# Patient Record
Sex: FEMALE | Race: WHITE
[De-identification: names, ages, dates, MRNs, and addresses within clinical notes are randomized per-mention and may not be internally consistent; named-entity substitution may affect disease eponyms.]

---

## 2019-02-26 ENCOUNTER — HOSPITAL ENCOUNTER (EMERGENCY)
Dept: HOSPITAL 62 - ER | Age: 24
Discharge: HOME | End: 2019-02-26
Payer: SELF-PAY

## 2019-02-26 VITALS — SYSTOLIC BLOOD PRESSURE: 120 MMHG | DIASTOLIC BLOOD PRESSURE: 88 MMHG

## 2019-02-26 DIAGNOSIS — R09.81: ICD-10-CM

## 2019-02-26 DIAGNOSIS — R50.9: ICD-10-CM

## 2019-02-26 DIAGNOSIS — R51: ICD-10-CM

## 2019-02-26 DIAGNOSIS — J06.9: Primary | ICD-10-CM

## 2019-02-26 DIAGNOSIS — R05: ICD-10-CM

## 2019-02-26 DIAGNOSIS — R07.9: ICD-10-CM

## 2019-02-26 DIAGNOSIS — J40: ICD-10-CM

## 2019-02-26 LAB
ADD MANUAL DIFF: NO
ALBUMIN SERPL-MCNC: 4.4 G/DL (ref 3.5–5)
ALP SERPL-CCNC: 85 U/L (ref 38–126)
ALT SERPL-CCNC: 56 U/L (ref 9–52)
ANION GAP SERPL CALC-SCNC: 9 MMOL/L (ref 5–19)
APPEARANCE UR: CLEAR
APTT PPP: YELLOW S
AST SERPL-CCNC: 54 U/L (ref 14–36)
BASOPHILS # BLD AUTO: 0 10^3/UL (ref 0–0.2)
BASOPHILS NFR BLD AUTO: 0.4 % (ref 0–2)
BILIRUB DIRECT SERPL-MCNC: 0.1 MG/DL (ref 0–0.4)
BILIRUB SERPL-MCNC: 0.4 MG/DL (ref 0.2–1.3)
BILIRUB UR QL STRIP: NEGATIVE
BUN SERPL-MCNC: 12 MG/DL (ref 7–20)
CALCIUM: 9.2 MG/DL (ref 8.4–10.2)
CHLORIDE SERPL-SCNC: 104 MMOL/L (ref 98–107)
CO2 SERPL-SCNC: 27 MMOL/L (ref 22–30)
EOSINOPHIL # BLD AUTO: 0 10^3/UL (ref 0–0.6)
EOSINOPHIL NFR BLD AUTO: 0.4 % (ref 0–6)
ERYTHROCYTE [DISTWIDTH] IN BLOOD BY AUTOMATED COUNT: 13.2 % (ref 11.5–14)
GLUCOSE SERPL-MCNC: 99 MG/DL (ref 75–110)
GLUCOSE UR STRIP-MCNC: 150 MG/DL
HCT VFR BLD CALC: 39 % (ref 36–47)
HGB BLD-MCNC: 13.2 G/DL (ref 12–15.5)
KETONES UR STRIP-MCNC: NEGATIVE MG/DL
LYMPHOCYTES # BLD AUTO: 0.9 10^3/UL (ref 0.5–4.7)
LYMPHOCYTES NFR BLD AUTO: 20.7 % (ref 13–45)
MCH RBC QN AUTO: 26.5 PG (ref 27–33.4)
MCHC RBC AUTO-ENTMCNC: 33.8 G/DL (ref 32–36)
MCV RBC AUTO: 78 FL (ref 80–97)
MONOCYTES # BLD AUTO: 0.7 10^3/UL (ref 0.1–1.4)
MONOCYTES NFR BLD AUTO: 16 % (ref 3–13)
NEUTROPHILS # BLD AUTO: 2.8 10^3/UL (ref 1.7–8.2)
NEUTS SEG NFR BLD AUTO: 62.5 % (ref 42–78)
NITRITE UR QL STRIP: NEGATIVE
PH UR STRIP: 6 [PH] (ref 5–9)
PLATELET # BLD: 162 10^3/UL (ref 150–450)
POTASSIUM SERPL-SCNC: 4.3 MMOL/L (ref 3.6–5)
PROT SERPL-MCNC: 7.2 G/DL (ref 6.3–8.2)
PROT UR STRIP-MCNC: NEGATIVE MG/DL
RBC # BLD AUTO: 4.97 10^6/UL (ref 3.72–5.28)
SODIUM SERPL-SCNC: 139.6 MMOL/L (ref 137–145)
SP GR UR STRIP: 1.02
TOTAL CELLS COUNTED % (AUTO): 100 %
UROBILINOGEN UR-MCNC: NEGATIVE MG/DL (ref ?–2)
WBC # BLD AUTO: 4.5 10^3/UL (ref 4–10.5)

## 2019-02-26 PROCEDURE — 71046 X-RAY EXAM CHEST 2 VIEWS: CPT

## 2019-02-26 PROCEDURE — 93010 ELECTROCARDIOGRAM REPORT: CPT

## 2019-02-26 PROCEDURE — 81025 URINE PREGNANCY TEST: CPT

## 2019-02-26 PROCEDURE — 80053 COMPREHEN METABOLIC PANEL: CPT

## 2019-02-26 PROCEDURE — 96360 HYDRATION IV INFUSION INIT: CPT

## 2019-02-26 PROCEDURE — 81001 URINALYSIS AUTO W/SCOPE: CPT

## 2019-02-26 PROCEDURE — 85025 COMPLETE CBC W/AUTO DIFF WBC: CPT

## 2019-02-26 PROCEDURE — 99284 EMERGENCY DEPT VISIT MOD MDM: CPT

## 2019-02-26 PROCEDURE — 96361 HYDRATE IV INFUSION ADD-ON: CPT

## 2019-02-26 PROCEDURE — 93005 ELECTROCARDIOGRAM TRACING: CPT

## 2019-02-26 PROCEDURE — 36415 COLL VENOUS BLD VENIPUNCTURE: CPT

## 2019-02-26 NOTE — RADIOLOGY REPORT (SQ)
EXAM DESCRIPTION:  CHEST 2 VIEWS



COMPLETED DATE/TIME:  2/26/2019 7:50 am



REASON FOR STUDY:  cough



COMPARISON:  None.



EXAM PARAMETERS:  NUMBER OF VIEWS: two views

TECHNIQUE: Digital Frontal and Lateral radiographic views of the chest acquired.

RADIATION DOSE: NA

LIMITATIONS: none



FINDINGS:  LUNGS AND PLEURA: No opacities, masses or pneumothorax. No pleural effusion.

MEDIASTINUM AND HILAR STRUCTURES: No masses or contour abnormalities.

HEART AND VASCULAR STRUCTURES: Heart normal size.  No evidence for failure.

BONES: No acute findings.

HARDWARE: None in the chest.

OTHER: No other significant finding.



IMPRESSION:  1. NO ACUTE RADIOGRAPHIC FINDING IN THE CHEST.



TECHNICAL DOCUMENTATION:  JOB ID:  [id number]

 2011 [employer]- All Rights Reserved



Reading location - IP/workstation name: [email or web address]

## 2019-02-26 NOTE — ER DOCUMENT REPORT
ED General





- General


Chief Complaint: Painful Cough


Stated Complaint: CHEST PAIN


Time Seen by Provider: 02/26/19 07:40


Notes: 





Patient is a 23-year-old female presents to the emergency department for 

generalized cough, congestion, subjective fever, headache, chest pain upon deep 

inspiration and cough for the last 3 days.  Patient states initially she has 

been coughing up green sputum that has been blood tinged.  Patient states she 

has overall body aches and just feels more tired than normal.  





Past medical history: None


Medications: None


Allergies: None


Last menstrual period 2/21/2019








TRAVEL OUTSIDE OF THE [place] IN LAST 30 DAYS: No





- Related Data


Allergies/Adverse Reactions: 


                                        





No Known Allergies Allergy (Verified 07/05/17 12:03)


   











Past Medical History





- General


Information source: Patient





- Social History


Smoking Status: Unknown if Ever Smoked


Family History: Reviewed & Not Pertinent


Patient has suicidal ideation: No


Patient has homicidal ideation: No





- Past Medical History


Cardiac Medical History: 


   Denies: Hx Coronary Artery Disease, Hx Heart Attack, Hx Hypertension


Pulmonary Medical History: 


   Denies: Hx Asthma, Hx Bronchitis, Hx COPD, Hx Pneumonia


Neurological Medical History: Denies: Hx Cerebrovascular Accident, Hx Seizures


Renal/ Medical History: Denies: Hx Peritoneal Dialysis


Musculoskeletal Medical History: Denies Hx Arthritis


Past Surgical History: Reports: Hx Appendectomy





- Immunizations


Hx Diphtheria, Pertussis, Tetanus Vaccination: Yes





Review of Systems





- Review of Systems


Constitutional: See HPI


EENT: See HPI


Cardiovascular: See HPI


Respiratory: See HPI


Gastrointestinal: No symptoms reported


Genitourinary: No symptoms reported


Female Genitourinary: See HPI


Musculoskeletal: See HPI


Skin: No symptoms reported


Hematologic/Lymphatic: No symptoms reported


Neurological/Psychological: See HPI





Physical Exam





- Vital signs


Vitals: 


                                        











Temp Pulse Resp BP Pulse Ox


 


 99.3 F   83   16   121/62   97 


 


 02/26/19 07:09  02/26/19 07:09  02/26/19 07:09  02/26/19 07:09  02/26/19 07:09














- Notes


Notes: 





GENERAL: Alert, interacts well. No acute distress.


HEAD: Normocephalic, atraumatic.  No frontal or maxillary sinus tenderness noted


EYES: Pupils equal, round, and reactive to light. Extraocular movements intact.


ENT: Oral mucosa moist, tongue midline. Nares patent, TM's intact, 

nonerythematous, nonbulging bilaterally.  Pharynx within normal limits no 

palatal petechiae noted.


NECK: Full range of motion. Supple. Trachea midline.  No nuchal rigidity noted


LUNGS: Clear to auscultation bilaterally, no wheezes, rales, or rhonchi. No 

respiratory distress.


HEART: Regular rate and rhythm. No murmur


ABDOMEN: Soft, non-tender. Non-distended. Bowel sounds present in all 4 

quadrants.  No McBurney's point tenderness, no Sinclair sign noted


EXTREMITIES: Moves all 4 extremities spontaneously. No edema, normal radial and 

dorsalis pedis pulses bilaterally. No cyanosis.


BACK: no cervical, thoracic, lumbar midline tenderness. No saddle anesthesia, 

normal distal neurovascular exam.  No CVA tenderness noted bilaterally


NEUROLOGICAL: Alert and oriented x3. Normal speech. cranial nerves II through 

XII grossly intact. 


PSYCH: Normal affect, normal mood.


SKIN: Warm, dry, normal turgor. No rashes or lesions noted.








Course





- Re-evaluation


Re-evalutation: 





02/26/19 11:06


Patient's labs show no signs of leukocytosis, no signs of anemia, no signs of 

electrolyte abnormalities.  Patient's urine shows no signs of infection, 

negative hCG.  She does appear well-hydrated with a specific gravity of 1.018 

with negative ketones.  Patient's chest x-ray shows no signs of pneumonia, 

pneumothorax, rib fracture.  Patient overall states she feels better after fluid

administration and Motrin.


Patient's EKG shows a sinus rhythm 69, QTc 403, no ST segment elevations or 

depressions noted.


Discussed likely viral diagnosis, potential bronchitis which is also viral in 

nature.  Patient voices understanding.  Patient does not meet CDC 

recommendations for influenza testing.  Patient is non-tachycardic, afebrile, 

stable for discharge.





- Vital Signs


Vital signs: 


                                        











Temp Pulse Resp BP Pulse Ox


 


 98.2 F   62   16   120/88 H  100 


 


 02/26/19 10:39  02/26/19 10:39  02/26/19 07:09  02/26/19 10:39  02/26/19 10:39














- Laboratory


Result Diagrams: 


                                 02/26/19 08:29





                                 02/26/19 08:29


Laboratory results interpreted by me: 


                                        











  02/26/19 02/26/19 02/26/19





  08:29 08:29 09:17


 


MCV  78 L  


 


MCH  26.5 L  


 


Monocytes %  16.0 H  


 


AST   54 H 


 


ALT   56 H 


 


Urine Glucose (UA)    150 H














Discharge





- Discharge


Clinical Impression: 


 Bronchitis





Upper respiratory infection


Qualifiers:


 URI type: unspecified viral URI Qualified Code(s): J06.9 - Acute upper 

respiratory infection, unspecified





Condition: Stable


Disposition: HOME, SELF-CARE


Instructions:  Viral Syndrome ([facility]), Upper Respiratory Illness ([facility]), Bronchitis

([facility])


Additional Instructions: 


As we discussed you have been seen and treated in the emergency department for 

an upper respiratory infection.  Upper respiratory infections are typically 

caused by viruses and do not respond to antibiotics.  Please make sure you 

continue to take over-the-counter Tylenol and Motrin for your generalized body 

aches headache and fever.  Please also follow-up with your primary care provider

in the next 24-48 hours.  Please also return to the emergency room should he 

have any other concerning symptoms.


Prescriptions: 


Benzonatate [Tessalon Perles 100 mg Capsule] 100 mg PO Q8HP PRN #40 capsule


 PRN Reason: 


Forms:  Return to Work

## 2019-06-12 ENCOUNTER — HOSPITAL ENCOUNTER (EMERGENCY)
Dept: HOSPITAL 62 - ER | Age: 24
Discharge: HOME | End: 2019-06-12
Payer: MEDICAID

## 2019-06-12 VITALS — DIASTOLIC BLOOD PRESSURE: 61 MMHG | SYSTOLIC BLOOD PRESSURE: 111 MMHG

## 2019-06-12 DIAGNOSIS — Z90.49: ICD-10-CM

## 2019-06-12 DIAGNOSIS — R10.2: ICD-10-CM

## 2019-06-12 DIAGNOSIS — N83.201: Primary | ICD-10-CM

## 2019-06-12 LAB
ADD MANUAL DIFF: NO
ALBUMIN SERPL-MCNC: 4.7 G/DL (ref 3.5–5)
ALP SERPL-CCNC: 69 U/L (ref 38–126)
ALT SERPL-CCNC: 41 U/L (ref 9–52)
ANION GAP SERPL CALC-SCNC: 9 MMOL/L (ref 5–19)
APPEARANCE UR: (no result)
APTT PPP: YELLOW S
AST SERPL-CCNC: 27 U/L (ref 14–36)
BACTERIA (WET MOUNT): (no result)
BASOPHILS # BLD AUTO: 0 10^3/UL (ref 0–0.2)
BASOPHILS NFR BLD AUTO: 0.7 % (ref 0–2)
BILIRUB DIRECT SERPL-MCNC: 0.2 MG/DL (ref 0–0.4)
BILIRUB SERPL-MCNC: 0.5 MG/DL (ref 0.2–1.3)
BILIRUB UR QL STRIP: NEGATIVE
BUN SERPL-MCNC: 13 MG/DL (ref 7–20)
CALCIUM: 9.6 MG/DL (ref 8.4–10.2)
CHLAM PCR: NOT DETECTED
CHLORIDE SERPL-SCNC: 104 MMOL/L (ref 98–107)
CO2 SERPL-SCNC: 27 MMOL/L (ref 22–30)
EOSINOPHIL # BLD AUTO: 0.2 10^3/UL (ref 0–0.6)
EOSINOPHIL NFR BLD AUTO: 2.3 % (ref 0–6)
EPITHELIALS (WET MOUNT): (no result)
ERYTHROCYTE [DISTWIDTH] IN BLOOD BY AUTOMATED COUNT: 13.7 % (ref 11.5–14)
GLUCOSE SERPL-MCNC: 92 MG/DL (ref 75–110)
GLUCOSE UR STRIP-MCNC: 150 MG/DL
GON PCR: NOT DETECTED
HCT VFR BLD CALC: 40.9 % (ref 36–47)
HGB BLD-MCNC: 13.6 G/DL (ref 12–15.5)
KETONES UR STRIP-MCNC: NEGATIVE MG/DL
LYMPHOCYTES # BLD AUTO: 1.5 10^3/UL (ref 0.5–4.7)
LYMPHOCYTES NFR BLD AUTO: 21.7 % (ref 13–45)
MCH RBC QN AUTO: 26 PG (ref 27–33.4)
MCHC RBC AUTO-ENTMCNC: 33.4 G/DL (ref 32–36)
MCV RBC AUTO: 78 FL (ref 80–97)
MONOCYTES # BLD AUTO: 0.7 10^3/UL (ref 0.1–1.4)
MONOCYTES NFR BLD AUTO: 9.5 % (ref 3–13)
NEUTROPHILS # BLD AUTO: 4.6 10^3/UL (ref 1.7–8.2)
NEUTS SEG NFR BLD AUTO: 65.8 % (ref 42–78)
NITRITE UR QL STRIP: NEGATIVE
PH UR STRIP: 5 [PH] (ref 5–9)
PLATELET # BLD: 175 10^3/UL (ref 150–450)
POTASSIUM SERPL-SCNC: 4.1 MMOL/L (ref 3.6–5)
PROT SERPL-MCNC: 7.6 G/DL (ref 6.3–8.2)
PROT UR STRIP-MCNC: NEGATIVE MG/DL
RBC # BLD AUTO: 5.25 10^6/UL (ref 3.72–5.28)
SODIUM SERPL-SCNC: 140.2 MMOL/L (ref 137–145)
SP GR UR STRIP: 1.03
T.VAGINALIS (WET MOUNT): (no result)
TOTAL CELLS COUNTED % (AUTO): 100 %
UROBILINOGEN UR-MCNC: 2 MG/DL (ref ?–2)
WBC # BLD AUTO: 7 10^3/UL (ref 4–10.5)
WBCS (WET MOUNT): (no result)
YEAST (WET MOUNT): (no result)

## 2019-06-12 PROCEDURE — 84702 CHORIONIC GONADOTROPIN TEST: CPT

## 2019-06-12 PROCEDURE — 81001 URINALYSIS AUTO W/SCOPE: CPT

## 2019-06-12 PROCEDURE — 85025 COMPLETE CBC W/AUTO DIFF WBC: CPT

## 2019-06-12 PROCEDURE — 99284 EMERGENCY DEPT VISIT MOD MDM: CPT

## 2019-06-12 PROCEDURE — 87591 N.GONORRHOEAE DNA AMP PROB: CPT

## 2019-06-12 PROCEDURE — 93976 VASCULAR STUDY: CPT

## 2019-06-12 PROCEDURE — 87491 CHLMYD TRACH DNA AMP PROBE: CPT

## 2019-06-12 PROCEDURE — 36415 COLL VENOUS BLD VENIPUNCTURE: CPT

## 2019-06-12 PROCEDURE — 80053 COMPREHEN METABOLIC PANEL: CPT

## 2019-06-12 PROCEDURE — 87210 SMEAR WET MOUNT SALINE/INK: CPT

## 2019-06-12 PROCEDURE — 76830 TRANSVAGINAL US NON-OB: CPT

## 2019-06-12 NOTE — ER DOCUMENT REPORT
ED General





- General


Chief Complaint: Abdominal Pain


Stated Complaint: LOWER ABDOMINAL PAIN


Time Seen by Provider: 06/12/19 15:35


Primary Care Provider: 


ALBA LOMBARDO MD [ACTIVE STAFF] - Follow up as needed


HEALTH,EMPLOYEE [ACTIVE STAFF] - Follow up as needed


Mode of Arrival: Ambulatory


Information source: Patient, [facility] Records


Notes: 





23-year-old female with no reported past medical history presents with 2 days of

pelvic pain.  Patient states the pain started after intercourse 2 days ago.  

Initially she described it as a aching cramping pain that has progressively 

worsened.  Patient denies any foreign body insertion, rough intercourse, vaginal

bleeding, vaginal discharge, dysuria, hematuria.  Last menstrual period was May 

11, 2019.  Patient has a surgical history of appendectomy, cholecystectomy.  

Patient has not taken any medication for this.


TRAVEL OUTSIDE OF THE [place] IN LAST 30 DAYS: No





- HPI


Onset: Other


Onset/Duration: Gradual, Persistent, Worse


Quality of pain: Achy, Cramping


Severity: Moderate


Pain Level: 2


Associated symptoms: denies: Chest pain, Nonproductive cough, Fever, Nausea, 

Vomiting, Shortness of breath


Exacerbated by: Other - [place]


Relieved by: Denies


Similar symptoms previously: No


Recently seen / treated by doctor: No





- Related Data


Allergies/Adverse Reactions: 


                                        





No Known Allergies Allergy (Verified 07/05/17 12:03)


   











Past Medical History





- General


Information source: Patient





- Social History


Smoking Status: Unknown if Ever Smoked


Chew tobacco use (# tins/day): No


Frequency of alcohol use: None


Drug Abuse: None


Family History: Reviewed & Not Pertinent


Patient has suicidal ideation: No


Patient has homicidal ideation: No





- Past Medical History


Cardiac Medical History: 


   Denies: Hx Coronary Artery Disease, Hx Heart Attack, Hx Hypertension


Pulmonary Medical History: 


   Denies: Hx Asthma, Hx Bronchitis, Hx COPD, Hx Pneumonia


Neurological Medical History: Denies: Hx Cerebrovascular Accident, Hx Seizures


Renal/ Medical History: Denies: Hx Peritoneal Dialysis


Musculoskeletal Medical History: Denies Hx Arthritis


Past Surgical History: Reports: Hx Appendectomy, Hx Cholecystectomy





- Immunizations


Hx Diphtheria, Pertussis, Tetanus Vaccination: Yes





Review of Systems





- Review of Systems


Notes: 





REVIEW OF SYSTEMS:


CONSTITUTIONAL :  Denies fever,  chills, or sweats.  Denies recent illness. 

Denies weight loss, recent hospitalizations. 


EENT: Denies visual changes, eye pain.  Denies sore throat, oral lesions, 

difficulty swallowing.


CARDIOVASCULAR:  Denies chest pain.  Denies palpitations. Denies lower extremity

edema.


RESPIRATORY:  Denies cough. Denies shortness of breath, wheezing.


GASTROINTESTINAL:  Denies abdominal  distention.  Denies nausea, vomiting, or 

diarrhea.  Denies blood in vomitus, stools, or per rectum.  Denies black, tarry 

stools.  Denies constipation.  


GENITOURINARY:  Denies difficulty urinating, painful urination,  frequency, 

blood in urine, or  vaginal discharge.


MUSCULOSKELETAL:  Denies back or neck pain or stiffness.  Denies joint pain or 

swelling.


SKIN:   Denies rash, lesions or sores.


HEMATOLOGIC :   Denies easy bruising or bleeding.


LYMPHATIC:  Denies swollen glands.


NEUROLOGICAL:  Denies confusion or altered mental status.  Denies loss of 

consciousness.  Denies dizziness or lightheadedness.  Denies headache.  Denies 

weakness or paralysis.  Denies problems difficulty with ambulation, slurred 

speech.  Denies sensory loss, numbness, or tingling.  Denies seizures.


PSYCHIATRIC:  Denies anxiety or stress.  Denies depression, suicidal ideation, 

or homicidal ideation.  Denies visual or auditory hallucinations.








Physical Exam





- Vital signs


Vitals: 


                                        











Temp Pulse Resp BP Pulse Ox


 


 98.5 F   85   18   123/61   100 


 


 06/12/19 15:21  06/12/19 15:21  06/12/19 15:21  06/12/19 15:21  06/12/19 15:21














- Notes


Notes: 





PHYSICAL EXAMINATION:





GENERAL: Well-appearing, well-nourished and in no acute distress.





HEAD: Atraumatic, normocephalic.





EYES: Pupils equal round and reactive to light, extraocular movements intact, 

conjunctiva are normal.





ENT: Nares patent, oropharynx clear without exudates.  Moist mucous membranes.





NECK: Normal range of motion, supple without lymphadenopathy





LUNGS: Breath sounds clear to auscultation bilaterally and equal.  No wheezes 

rales or rhonchi.





HEART: Regular rate and rhythm without murmurs





ABDOMEN: Soft, nontender, nondistended abdomen.  No guarding, no rebound.  No ma

sses appreciated.





Female : Pelvic exam; External genitalia unremarkable. Speculum exam with no 

discharge.  Vaginal wall unremarkable.  Os closed.  No cervical motion 

tenderness.  No adnexal tenderness or masses appreciated.  Swabs obtained for 

gonorrhea, chlamydia and wet prep.





Musculoskeletal: Normal range of motion, no pitting or edema.  No cyanosis.





NEUROLOGICAL: Cranial nerves grossly intact.  Normal speech, normal gait.  

Normal sensory, motor exams





PSYCH: Normal mood, normal affect.





SKIN: Warm, Dry, normal turgor, no rashes or lesions noted.





Course





- Re-evaluation


Re-evalutation: 





06/12/19 18:06





Laboratory











  06/12/19 06/12/19 06/12/19





  15:54 15:54 15:54


 


WBC  7.0  


 


RBC  5.25  


 


Hgb  13.6  


 


Hct  40.9  


 


MCV  78 L  


 


MCH  26.0 L  


 


MCHC  33.4  


 


RDW  13.7  


 


Plt Count  175  


 


Seg Neutrophils %  65.8  


 


Lymphocytes %  21.7  


 


Monocytes %  9.5  


 


Eosinophils %  2.3  


 


Basophils %  0.7  


 


Absolute Neutrophils  4.6  


 


Absolute Lymphocytes  1.5  


 


Absolute Monocytes  0.7  


 


Absolute Eosinophils  0.2  


 


Absolute Basophils  0.0  


 


Sodium   140.2 


 


Potassium   4.1 


 


Chloride   104 


 


Carbon Dioxide   27 


 


Anion Gap   9 


 


BUN   13 


 


Creatinine   0.94 


 


Est GFR ( Amer)   > 60 


 


Est GFR (Non-Af Amer)   > 60 


 


Glucose   92 


 


Calcium   9.6 


 


Total Bilirubin   0.5 


 


Direct Bilirubin   0.2 


 


Neonat Total Bilirubin   Not Reportable 


 


Neonat Direct Bilirubin   Not Reportable 


 


Neonat Indirect Bili   Not Reportable 


 


AST   27 


 


ALT   41 


 


Alkaline Phosphatase   69 


 


Total Protein   7.6 


 


Albumin   4.7 


 


Beta HCG, Quant   < 2.39 


 


Total Beta HCG   NEGATIVE 


 


Urine Color    YELLOW


 


Urine Appearance    SLIGHTLY-CLOUDY


 


Urine pH    5.0


 


Ur Specific Gravity    1.029


 


Urine Protein    NEGATIVE


 


Urine Glucose (UA)    150 H


 


Urine Ketones    NEGATIVE


 


Urine Blood    NEGATIVE


 


Urine Nitrite    NEGATIVE


 


Urine Bilirubin    NEGATIVE


 


Urine Urobilinogen    2.0 H


 


Ur Leukocyte Esterase    NEGATIVE


 


Urine WBC (Auto)    1


 


Urine RBC (Auto)    1


 


U Hyaline Cast (Auto)    1


 


Squamous Epi Cells Auto    1


 


Urine Mucus (Auto)    RARE


 


Urine Ascorbic Acid    NEGATIVE











                                        





Transvaginal US  06/12/19 15:38


IMPRESSION:  Small complex cyst in the right adnexa.  Possible involuting cyst 

or ruptured cyst.  There is some free fluid.


 











                                        











Temp Pulse Resp BP Pulse Ox


 


 98.5 F   85   18   123/61   100 


 


 06/12/19 15:21  06/12/19 15:21  06/12/19 15:21  06/12/19 15:21  06/12/19 15:21











06/12/19 18:09


23-year-old female presents with 2 days of pelvic pain that occurred during 

intercourse and has progressively worsened.  Vital signs reviewed and within 

normal limits.  Patient does not appear toxic or dehydrated.  She is in no acute

distress.  Previous medical records and nursing notes reviewed.  Exam is 

significant for suprapubic tenderness.  CBC is without leukocytosis or anemia.  

CMP shows no electrolyte abnormality.  Urinalysis not consistent with infection.

 hCG negative.  Wet mount and GC pending.





Transvaginal ultrasound was obtained and showed a small complex cyst on the 

right adnexa with a small amount of free fluid.  Patient declining pain 

medication at this time.  Advised patient that NSAID medication could greatly 

help her discomfort.  Also advised her that she would need repeat follow-up for 

the complex cyst with OB/GYN.  Patient was treated with Flagyl for her bacterial

vaginosis.  Gonorrhea chlamydia negative.


06/13/19 19:44


Patient was evaluated and treated as appropriate for the patient's presenting 

symptoms and complaint, with consideration of any critical or life threatening 

conditions that may be associated with their obtained history and exam as noted 

above. All results were discussed with patient. Patient provided the opportunity

to ask questions, and express concerns. Patient was educated on treatments based

on their presumed diagnosis as noted above.  At this time we will discharge the 

patient with return precautions and follow-up recommendations.  Verbal discharge

instructions given a the bedside. Medication warnings reviewed. Patient is in 

agreement with this plan and has verbalized understanding of return precautions.







After careful consideration I feel that that patient can be safely discharged 

from the emergency department,  they were advised to followup with a primary 

care physician in 2-3 days. 








Dictation on this chart was performed using voice recognition software and may 

result in unintended grammatical, spelling, syntax or errors.

















- Vital Signs


Vital signs: 


                                        











Temp Pulse Resp BP Pulse Ox


 


 98.1 F   73   18   111/61   100 


 


 06/12/19 18:49  06/12/19 18:49  06/12/19 18:49  06/12/19 18:49  06/12/19 18:49














- Laboratory


Result Diagrams: 


                                 06/12/19 15:54





                                 06/12/19 15:54


Laboratory results interpreted by me: 


                                        











  06/12/19 06/12/19





  15:54 15:54


 


MCV  78 L 


 


MCH  26.0 L 


 


Urine Glucose (UA)   150 H


 


Urine Urobilinogen   2.0 H














- Diagnostic Test


Radiology reviewed: Image reviewed, Reports reviewed





Discharge





- Discharge


Clinical Impression: 


 Pelvic pain, Complex cyst of right ovary





Condition: Good


Disposition: HOME, SELF-CARE


Instructions:  Abdominal Pain ([facility]), Ovarian Cyst ([facility]), Vaginosis, Bacterial 

([facility])


Additional Instructions: 


You are being treated for bacterial vaginosis, an overgrowth of normal bacteria 

in the vagina. You are being sent home on an antibiotic called metronidazole.  

Take exactly as directed.  Never drink alcohol while taking this antibiotic.  

Please return if you develop abdominal pain, fever greater than 101F, some 

vomiting, or any other symptoms that are concerning to you.








Your ultrasound showed a complex cyst.  He will need to follow-up with OB/GYN.


Prescriptions: 


Metronidazole [Flagyl 500 mg Tablet] 500 mg PO BID 7 Days #14 tablet


Ondansetron [Zofran Odt 4 mg Tablet] 1 - 2 tab PO Q4H PRN #15 tab.rapdis


 PRN Reason: For Nausea/Vomiting


Forms:  Return to Work


Referrals: 


HEALTH,EMPLOYEE [ACTIVE STAFF] - Follow up as needed


ALBA LOMBARDO MD [ACTIVE STAFF] - Follow up as needed

## 2019-06-12 NOTE — RADIOLOGY REPORT (SQ)
EXAM DESCRIPTION:  U/S NON OB PEL TV W/DOPPLER



COMPLETED DATE/TIME:  6/12/2019 5:12 pm



REASON FOR STUDY:  pelvic pain



COMPARISON:  None.



TECHNIQUE:  Dynamic and static grayscale images acquired of the pelvis via transvaginal approach and 
recorded on PACS. Additional selected color Doppler and spectral images recorded.



LIMITATIONS:  None.



FINDINGS:  UTERUS: Contour normal.  No mass.

ENDOMETRIAL STRIPE: No focal or generalized thickening. No masses.

CERVIX: 2.2 cm.  No nabothian cysts.

RIGHT OVARY AND DOPPLER: Normal size. No worrisome masses.  2.3 cm complex cyst.  Normal arterial vas
cular flow without evidence for torsion.

LEFT OVARY AND DOPPLER: Normal size. No worrisome masses. Normal arterial vascular flow without evide
nce for torsion.

FREE FLUID: There is some free fluid in the posterior cul-de-sac and in the right adnexa.

OTHER: No other significant finding.

MEASUREMENTS:

UTERUS: 10.5 x 5.7 x 5.1 cm.

ENDOMETRIAL STRIPE: 1.5 cm.

RIGHT OVARY: 4.2 x 3.3 x 3 cm.

LEFT OVARY: 2.8 x 2.4 x 2.2 cm.



IMPRESSION:  Small complex cyst in the right adnexa.  Possible involuting cyst or ruptured cyst.  The
re is some free fluid.



TECHNICAL DOCUMENTATION:  JOB ID:  [id number]

 [employer]- All Rights Reserved                          Rev-[id number]



Reading location - IP/workstation name: [email or web address]

## 2019-06-12 NOTE — ER DOCUMENT REPORT
ED Medical Screen (RME)





- General


Chief Complaint: Abdominal Pain


Stated Complaint: LOWER ABDOMINAL PAIN


Time Seen by Provider: 06/12/19 15:35


Primary Care Provider: 


HEALTH,EMPLOYEE [Primary Care Provider] - Follow up as needed


Mode of Arrival: Ambulatory


Information source: Patient


Notes: 





22-year-old female presented to ED for complaint of pelvic pain for the last 2 

to 3 days.  She states it started while she and her  were having sex.  

She states that her last menstrual period was 5/11/2019.  She states the pain is

been slowly increasing since this started.  She states is bilateral lower 

pelvic.  She denies any vaginal bleeding.  She denies any vaginal discharge.  

She denies any urinary symptoms.











I have greeted and performed a rapid initial assessment of this patient.  A 

comprehensive ED assessment and evaluation of the patient, analysis of test 

results and completion of medical decision making process will be conducted by 

an additional ED providers.








Dictation of this chart was performed using voice recognition software; 

therefore, there may be some unintended grammatical errors.


TRAVEL OUTSIDE OF THE [place] IN LAST 30 DAYS: No





- Related Data


Allergies/Adverse Reactions: 


                                        





No Known Allergies Allergy (Verified 07/05/17 12:03)


   











Past Medical History





- Social History


Chew tobacco use (# tins/day): No


Frequency of alcohol use: None


Drug Abuse: None





- Past Medical History


Cardiac Medical History: 


   Denies: Hx Coronary Artery Disease, Hx Heart Attack, Hx Hypertension


Pulmonary Medical History: 


   Denies: Hx Asthma, Hx Bronchitis, Hx COPD, Hx Pneumonia


Neurological Medical History: Denies: Hx Cerebrovascular Accident, Hx Seizures


Renal/ Medical History: Denies: Hx Peritoneal Dialysis


Musculoskeltal Medical History: Denies Hx Arthritis


Past Surgical History: Reports: Hx Appendectomy, Hx Cholecystectomy





- Immunizations


Hx Diphtheria, Pertussis, Tetanus Vaccination: Yes





Physical Exam





- Vital signs


Vitals: 





                                        











Temp Pulse Resp BP Pulse Ox


 


 98.5 F   85   18   123/61   100 


 


 06/12/19 15:21  06/12/19 15:21  06/12/19 15:21  06/12/19 15:21  06/12/19 15:21














Course





- Vital Signs


Vital signs: 





                                        











Temp Pulse Resp BP Pulse Ox


 


 98.5 F   85   18   123/61   100 


 


 06/12/19 15:21  06/12/19 15:21  06/12/19 15:21  06/12/19 15:21  06/12/19 15:21














Doctor's Discharge





- Discharge


Referrals: 


HEALTH,EMPLOYEE [Primary Care Provider] - Follow up as needed

## 2020-10-11 ENCOUNTER — HOSPITAL ENCOUNTER (EMERGENCY)
Dept: HOSPITAL 62 - ER | Age: 25
Discharge: ANOTHER HOSPITAL | End: 2020-10-11
Payer: SELF-PAY

## 2020-10-11 VITALS — SYSTOLIC BLOOD PRESSURE: 125 MMHG | DIASTOLIC BLOOD PRESSURE: 75 MMHG

## 2020-10-11 DIAGNOSIS — R29.810: ICD-10-CM

## 2020-10-11 DIAGNOSIS — R20.2: ICD-10-CM

## 2020-10-11 DIAGNOSIS — R29.711: ICD-10-CM

## 2020-10-11 DIAGNOSIS — M62.81: ICD-10-CM

## 2020-10-11 DIAGNOSIS — G83.9: ICD-10-CM

## 2020-10-11 DIAGNOSIS — I63.89: Primary | ICD-10-CM

## 2020-10-11 LAB
ADD MANUAL DIFF: NO
ALBUMIN SERPL-MCNC: 5.2 G/DL (ref 3.5–5)
ALP SERPL-CCNC: 70 U/L (ref 38–126)
ANION GAP SERPL CALC-SCNC: 13 MMOL/L (ref 5–19)
APTT BLD: 29.5 SEC (ref 23.5–35.8)
AST SERPL-CCNC: 22 U/L (ref 14–36)
BASOPHILS # BLD AUTO: 0 10^3/UL (ref 0–0.2)
BASOPHILS NFR BLD AUTO: 0.3 % (ref 0–2)
BILIRUB DIRECT SERPL-MCNC: 0.2 MG/DL (ref 0–0.4)
BILIRUB SERPL-MCNC: 0.4 MG/DL (ref 0.2–1.3)
BUN SERPL-MCNC: 14 MG/DL (ref 7–20)
CALCIUM: 9.8 MG/DL (ref 8.4–10.2)
CHLORIDE SERPL-SCNC: 102 MMOL/L (ref 98–107)
CK MB SERPL-MCNC: 0.23 NG/ML (ref ?–4.55)
CK SERPL-CCNC: 34 U/L (ref 30–135)
CO2 SERPL-SCNC: 25 MMOL/L (ref 22–30)
EOSINOPHIL # BLD AUTO: 0.1 10^3/UL (ref 0–0.6)
EOSINOPHIL NFR BLD AUTO: 1.2 % (ref 0–6)
ERYTHROCYTE [DISTWIDTH] IN BLOOD BY AUTOMATED COUNT: 13.2 % (ref 11.5–14)
GLUCOSE SERPL-MCNC: 107 MG/DL (ref 75–110)
HCT VFR BLD CALC: 41.7 % (ref 36–47)
HGB BLD-MCNC: 14.2 G/DL (ref 12–15.5)
INR PPP: 0.99
LYMPHOCYTES # BLD AUTO: 1.5 10^3/UL (ref 0.5–4.7)
LYMPHOCYTES NFR BLD AUTO: 19.4 % (ref 13–45)
MCH RBC QN AUTO: 26.6 PG (ref 27–33.4)
MCHC RBC AUTO-ENTMCNC: 34.1 G/DL (ref 32–36)
MCV RBC AUTO: 78 FL (ref 80–97)
MONOCYTES # BLD AUTO: 0.7 10^3/UL (ref 0.1–1.4)
MONOCYTES NFR BLD AUTO: 8.9 % (ref 3–13)
NEUTROPHILS # BLD AUTO: 5.4 10^3/UL (ref 1.7–8.2)
NEUTS SEG NFR BLD AUTO: 70.2 % (ref 42–78)
PLATELET # BLD: 202 10^3/UL (ref 150–450)
POTASSIUM SERPL-SCNC: 3.8 MMOL/L (ref 3.6–5)
PROT SERPL-MCNC: 8.2 G/DL (ref 6.3–8.2)
PROTHROMBIN TIME: 13.3 SEC (ref 11.4–15.4)
RBC # BLD AUTO: 5.33 10^6/UL (ref 3.72–5.28)
TOTAL CELLS COUNTED % (AUTO): 100 %
TROPONIN I SERPL-MCNC: < 0.012 NG/ML
WBC # BLD AUTO: 7.6 10^3/UL (ref 4–10.5)

## 2020-10-11 PROCEDURE — 85610 PROTHROMBIN TIME: CPT

## 2020-10-11 PROCEDURE — 85025 COMPLETE CBC W/AUTO DIFF WBC: CPT

## 2020-10-11 PROCEDURE — 84703 CHORIONIC GONADOTROPIN ASSAY: CPT

## 2020-10-11 PROCEDURE — 82962 GLUCOSE BLOOD TEST: CPT

## 2020-10-11 PROCEDURE — 82553 CREATINE MB FRACTION: CPT

## 2020-10-11 PROCEDURE — 93010 ELECTROCARDIOGRAM REPORT: CPT

## 2020-10-11 PROCEDURE — 36415 COLL VENOUS BLD VENIPUNCTURE: CPT

## 2020-10-11 PROCEDURE — 84484 ASSAY OF TROPONIN QUANT: CPT

## 2020-10-11 PROCEDURE — 70450 CT HEAD/BRAIN W/O DYE: CPT

## 2020-10-11 PROCEDURE — 80053 COMPREHEN METABOLIC PANEL: CPT

## 2020-10-11 PROCEDURE — 71045 X-RAY EXAM CHEST 1 VIEW: CPT

## 2020-10-11 PROCEDURE — 85730 THROMBOPLASTIN TIME PARTIAL: CPT

## 2020-10-11 PROCEDURE — 99285 EMERGENCY DEPT VISIT HI MDM: CPT

## 2020-10-11 PROCEDURE — 93005 ELECTROCARDIOGRAM TRACING: CPT

## 2020-10-11 PROCEDURE — 37195 THROMBOLYTIC THERAPY STROKE: CPT

## 2020-10-11 PROCEDURE — 82550 ASSAY OF CK (CPK): CPT

## 2020-10-11 NOTE — ER DOCUMENT REPORT
ED Neuro Symptoms/Deficit





- General


Chief Complaint: S/S of Possible Stroke


Stated Complaint: RIGHT UNDER ARM ISSUE


Time Seen by Provider: 10/11/20 19:51


Mode of Arrival: Ambulatory


TRAVEL OUTSIDE OF THE [place] IN LAST 30 DAYS: No





- HPI


Patient complains to provider of: Facial Droop, Paralysis, Paresthesia


Onset: Other - 1 hour prior to presentation


Exact time of onset: Approximately 1900 hrs.


Symptoms are: Constant


Duration: Continues in ED


Quality of pain: No pain


Severity: Severe


Pain Level: Denies


Context: Other - Sudden onset with out preceding exacerbating factors


Loss of consciousness: No loss of consciousness


Was STROKE ALERT Called: Yes


Baseline Cognitive: Alert, oriented X 3


Baseline Gait: Walks w/o assistance


Pre-existing weakness: No: Face, General, Hand, Lower extremity, Upper extremity


Alert To: Name/Voice


Patient Orientation: Person, Place, Time, Events


Character of altered mental status: No: Agitated, Confused, Combative, Decreased

responsiveness, Disoriented, Seizure activity, Trouble concentrating, Unchanged 

from baseline, Unresponsive


New weakness: RUE, RLE, R facial


Altered sensation: RUE, RLE, R facial


Vision problem/glaucoma: No


Associated symptoms: denies: Neck pain


Similar symptoms previously: No


Notes: 





Patient presents complaining of right-sided facial droop and paresthesias right 

upper extremity weakness and paresthesias and right lower extremity paresthesias

and weakness.  Patient states symptoms were sudden in onset with no preceding 

factors such as traumatic injury.  Patient denies headache.  Patient denies 

prior history of cerebrovascular disease, TIA, hemorrhagic stroke.  Patient 

states that she has no history of recent fever, chills, cough, shortness of 

breath, chest pain, prior COVID infection, known exposure to persons positive 

for COVID, known exposure to persons under investigation for COVID-19.  Patient 

states nothing exacerbates her symptoms and nothing alleviates her symptoms.  

Patient denies smoking cigarettes, drinking alcohol or drug use.





- Related Data


Allergies/Adverse Reactions: 


                                        





No Known Allergies Allergy (Verified 07/05/17 12:03)


   











Past Medical History





- General


Information source: Patient





- Social History


Smoking Status: Never Smoker


Frequency of alcohol use: None


Drug Abuse: None


Lives with: Family


Family History: Reviewed & Not Pertinent


Patient has suicidal ideation: No


Patient has homicidal ideation: No





- Past Medical History


Cardiac Medical History: 


   Denies: Hx Coronary Artery Disease, Hx Heart Attack, Hx Hypertension


Pulmonary Medical History: 


   Denies: Hx Asthma, Hx Bronchitis, Hx COPD, Hx Pneumonia


Neurological Medical History: Denies: Hx Cerebrovascular Accident, Hx Seizures


Renal/ Medical History: Denies: Hx Peritoneal Dialysis


Musculoskeletal Medical History: Denies Hx Arthritis


Past Surgical History: Reports: Hx Appendectomy, Hx Cholecystectomy





- Immunizations


Hx Diphtheria, Pertussis, Tetanus Vaccination: Yes





Review of Systems





- Review of Systems


Constitutional: No symptoms reported


EENT: No symptoms reported


Cardiovascular: denies: Chest pain


Respiratory: denies: Cough, Short of breath


Gastrointestinal: No symptoms reported


Genitourinary: No symptoms reported


Female Genitourinary: No symptoms reported


Musculoskeletal: No symptoms reported


Skin: No symptoms reported


Hematologic/Lymphatic: No symptoms reported


Neurological/Psychological: Sensory change, Weakness, Loss of power, Numbness.  

denies: Headaches, Speech impairment


-: Yes All other systems reviewed and negative





Physical Exam





- Vital signs


Vitals: 


                                        











Temp Pulse Resp BP Pulse Ox


 


 99.1 F   74   18   133/62 H  95 


 


 10/11/20 19:40  10/11/20 19:40  10/11/20 19:40  10/11/20 19:40  10/11/20 19:40














- Notes


Notes: 





CONSTITUTIONAL 


[Vital signs reviewed, Patient appears comfortable, Alert and oriented X 3, 

Normal stature.]


HEAD 


[Atraumatic, Normocephalic.]


EYES 


[Eyes are normal to inspection, No discharge from eyes, Extraocular muscles 

intact, Sclera are normal, Conjunctiva are normal.]


ENT 


 No rhinorrhea, Mouth normal to inspection.]


NECK 


[Normal ROM, No jugular venous distention, No meningeal signs]


RESPIRATORY CHEST 


[Chest is nontender, Breath sounds normal, No respiratory distress.]


CARDIOVASCULAR 


[RRR, No murmurs, Normal S1 S2, No rub, No gallop.]


ABDOMEN 


[Abdomen is nontender, No pulsatile masses, No other masses, Bowel sounds 

normal, No distension, No peritoneal signs, No hernias.]


BACK 


[There is no CVA Tenderness, There is no tenderness to palpation, Normal inspect

ion.]


UPPER EXTREMITY 


 No cyanosis, No clubbing, No edema, 2+ radial pulses.]


LOWER EXTREMITY 


 No cyanosis, No clubbing, No edema, No calf tenderness, 2+ femoral pulses.]


NEURO 


Speech normal, sensation is diminished on light palpation on the right side of 

face, there does not appear to be unilateral involvement of the right forehead 

that would be consistent with Bell's palsy, visual confrontation is significant 

for a unilateral medial hemianopsia in the patient's right eye field, olfactory 

sensation was not tested remainder of facial exam is significant for right 

facial droop.  Patient has extremely minimal ability to shrug the right 

shoulder.  Patient is unable to lift right upper extremity actively and has 

obvious pronator drift and quick drop of the right upper extremity against grav

ity when pronator drift as tested.   strength is 2 out of 5 in the right 

hand sensation in fingertips is 2 out of 5.  Patient has intact dorsiflexion and

plantar flexion bilaterally.  Patient does have a positive pronator drift in her

right lower extremity and decreased sensation to palpation in her right lower 

extremity.


SKIN 


[Skin is warm, Skin is dry, Skin is normal color.]


PSYCHIATRIC 


[Slightly anxious affect. ]





Course





- Re-evaluation


Re-evalutation: 





10/11/20 22:02


Results of ED MSE, concern for ischemic stroke discussed with patient and 

patient's significant other.  Recommendation, plan for treatment with alteplase 

after discussion with Dr. Villela with [facility] was 

discussed with patient.  Pre-alteplase checklist was discussed with patient.  

There is no contraindications noted for administration of alteplase.  Dr. Villela 

is in agreement of administering alteplase now and transferring the patient.  He

stated that we could wait on the CTA of the head neck and he would do these 

studies when the patient arrived.  All questions were answered prior to 

transfer.





- Vital Signs


Vital signs: 


                                        











Temp Pulse Resp BP Pulse Ox


 


 98.5 F   79   15   132/82 H  98 


 


 10/11/20 20:32  10/11/20 20:32  10/11/20 20:32  10/11/20 20:32  10/11/20 20:32














- Laboratory


Result Diagrams: 


                                 10/11/20 20:10





                                 10/11/20 20:10


Laboratory results interpreted by me: 


                                        











  10/11/20 10/11/20





  20:10 20:10


 


RBC  5.33 H 


 


MCV  78 L 


 


MCH  26.6 L 


 


Albumin   5.2 H














- Diagnostic Test


Radiology reviewed: Reports reviewed





- EKG Interpretation by Me


Additional EKG results interpreted by me: 





10/11/20 22:04


EKG obtained on 10/11/2028 2009 hrs. was interpreted by this MD.  Findings 

normal sinus rhythm, rate 95, normal axis, DE intervals appear to be within 

normal limits, P waves proceed QRS complexes, QRS complexes appear narrow, QTC 

is 438, there are no obvious patterns of ST segment elevation or depression or 

reciprocal changes present to suggest acute myocardial ischemia or infarction.  

Impression: Normal sinus rhythm with nonspecific ST segments.





- Consults


  ** Dr. Villela, [facility]


Time consulted: 20:50 - Dr. Villela agreed with give Alteplase as long as no 

contraindications presents


Reason for consultation: 





10/11/20 22:12


right sided facial droop and upper extremity weakness





Critical Care Note





- Critical Care Note


Total time excluding time spent on procedures (mins): 90 - acute CVA





ED Alteplase Inc/Exc Criteria





- Date/Time patient last known well:


Date/Time: 1900, 10/11/2020





- Date/Time patient arrived in ED:


_: 2000, 10/11/2020





- Inclusion Criteria:


1: Patient presented to ED within 3 hours of acute ischemic stroke symptom 

onset?


-: Yes


2: Did baseline CT exclude intracranial hemorrhage and/or other risk factors?


-: Yes


3: Is the age of the patient 18 years of age or greater?


-: Yes


*****: If any of the above questions are answered "NO" then stop, patient is not

a candidate for Alteplase,


*****: If all of the above questions are answered "YES" then continue with 

Exclusion Criteria.





- Exclusion Criteria:


1: Is there evidence of intracranial hemorrhage on baseline CT?


-: No


2: Is there suspicion of subarachnoid hemorrhage (even if CT negative)?


-: No


3: Is there a history of serious head trauma, recent previous stroke or MI 

within 3 months?


-: No


4: Does the patient have a clinical presentation consistent with MI or post-MI 

pericarditis?


-: No


5: Is there history of intracranial hemorrhage?


-: No


6: On repeated measurement is Systolic BP greater than 185mmHg or Diastolic BP 

greater that 110 mmHg and is aggressive treatment needed to reduce blood 

pressure to these limits (e.g. constant infusion of an anti-hypertensive)?


-: No


7: Did the patient awake with stroke symptoms?


-: No


8: Has the patient had a lumbar puncture or an arterial puncture at a non-

compressile site within 7 days?


-: No


9: With in the last 14 days did the patient have surgery or major trauma?


-: No


10: Is the patient pregnant or postpartum less than 2 weeks?


-: No


11: Was there any active bleeding or acute trauma?


-: No


12: Does the patient have intracranial neoplasm, arteriovenous malformation or 

aneurysm?


-: No


13: Does the patient have abnormal glucose (less than 50 or greater than 

400mg/dl)?  Record glucose in Comment.


-: No


14: Patient has rapidly improving symptoms at the time Alteplase is to be 

Administered.


-: No


15: Does the patient have any risks for bleeding, including but not limited to:


a.: Current use of Coumadin with PT greater than 15 seconds or INR greater than 

1.7.


b.: Current use of Pradaxa (Dabigatran).


c.: Heparin administereed within the past 48 hours and PTT elevated.


d.: Platelet count less than 100,000/mm.


e.: Major surgery or serious trauma within 14 days.


f.: Gastrointestinal or gynecological urinary bleeding within 14 days.


g.: Myocardial Infarction (MI) within 3 months.


-: No


*****: If the answer to any of the above questions is "YES" then stop, the 

patient is not a candidate for Alteplase.


*****: If the answer to all of the above questions is "NO" then the patient may 

be eligible for the Administration of Alteplase.


*****: If the patient is noted to have seizure activity at onset of Stroke 

symptoms; Consult Neurologist for further evaluation.





- The patient is:


-: Included and is eligible to receive Alteplase.  *Initiate bed placement at 

higher level of care*


--: Yes


Reviewed risks & benefits of thrombolytic therapy: I have reviewed the risks and

benefits of thrombolytic therapy with the patient and/or his/her family.


Yes


-: Excluded and not eligible to receive Alteplase for the above exclusions.


-: Excluded and not eligible to receive Alteplase for other reasons (specify in 

comments):





- Diagnosis of TIA:


-: Patient presented with transient symptoms that are now resolved and no other 

neurologic findings are currently present. List symptoms in comments.


-: Patient is NOT a candidate for tPA.


-:  ____(put name in comment)______ has been consulted for admission and 

continued evaluation of risk factor assessment.





ED NIH Stroke Scale





- NIH Stroke Scale


When completed:: Before Alteplase


*: 1. NIH scale should be completed with appropriate accompanying assessment 

tools.


*: 2. The NIH should reflect what the patient is capable of doing and should not

be coached by the clinician.


1a. Level of Consciousness: 0=Alert;keenly responsive


-: 1=Drowsy


-: 2=Obtunded


-: 3=Coma/unresponsive or reflex to noxious stimuli.


1a. Responses: 0


1b. Orientation Questions: a. What month is it?


-: b. How old are you?


-: 0=Answers both questions correctly.


-: 1=Answers one question correctly or patient is intubated or has orotracheal 

trauma.


-: 2=Answers neither question correctly.


1b. Responses: 0


1c. Response to commands: a. Open and close eyes?


-: b.  and release hand?


-: Credit is given despite weakness. Demonstration of task is permitted. 

Substitute command if hands cannot be used.


-: 0=Performs both tasks correctly


-: 1=Performs one task correctly


-: 2=Performs neither task correctly


1c. Responses: 0


2. Gaze: Establish eye contact and instruct patient to "Follow my finger"


-: 0=Normal


-: 1=Partial gaze palsy. Gaze is abnormal in one or both eyes, but where forced 

deviation or total gaze paresis is not present.


-: 2=Forced deviation or total gaze paresis.


2. Responses: 0


3. Visual Fields: Sees fingers in all four quadrants.


-: 0=No visual loss.


-: 1=Partial hemianopsia.


-: 2=Complete hemianopsia.


-: 3=Bilateral hemianopsia (including Cortical blindness)


3. Responses: 1


4. Facial Movement: Instruct patient to:


-: a. Show me your teeth


-: b. Raise your eyebrows


-: c. Close your eyes


-: d. Smile


-: 0=Normal symmetrical movement


-: 1=Minor paralysis (flattened nasolabial fold, asymmetry on smiling).


-: 2=Partial paralysis (total or near total paralysis of lower face).


-: 3=Complete paralysis of upper and lower face


4. Responses: 2


5. Motor functions (left arm): Alternate sides and extend each arm with palms do

wn (90 degrees if sitting or 45 degrees for supine).


-: 0=No drift;limb holds for full 10 seconds.


-: 1=Drift; limb holds but drifts down before full 10 seconds, but does not hit 

bed.


-: 2=Some effort against gravity; limb cannot get to or maintain position.


-: 3=No effort against gravity; limb falls.


-: 4=No movement.


-: UN=Amputation, joint fusion, explain in comments.


5. Responses (left arm): 0


5. Motor Functions (right arm): Alternate sides and extend each arm with palms 

down (90 degrees if sitting or 45 degrees for supine).


-: 0=No drift;limb holds for full 10 seconds.


-: 1=Drift; limb holds but drifts down before full 10 seconds, but does not hit 

bed.


-: 2=Some effort against gravity; limb cannot get to or maintain position.


-: 3=No effort against gravity; limb falls.


-: 4=No movement.


-: UN=Amputation, joint fusion, explain in comments.


5. Responses (right arm): 3


6. Motor Functions (left leg): With patient lying supine, alternate sides and 

extend each leg (30 degrees always while supine).


-: 0=No drift, leg holds position for full 5 seconds


-: 1=Drift; leg falls before full 5 seconds but does not hit bed.


-: 2=Some effort against gravity, leg falls to bed but some effort against 

gravity.


-: 3=No effort against gravity, leg falls to bed immediately.


-: 4=No movement.


-: UN=Amputation, joint fusion; explain in comments.


6. Responses (left leg): 0


6. Motor Functions (right leg): With patient lying supine, alternate sides and 

extend each leg (30 degrees always while supine).


-: 0=No drift, leg holds position for full 5 seconds


-: 1=Drift; leg falls before full 5 seconds but does not hit bed.


-: 2=Some effort against gravity, leg falls to bed but some effort against 

gravity.


-: 3=No effort against gravity, leg falls to bed immediately.


-: 4=No movement.


-: UN=Amputation, joint fusion; explain in comments.


6. Responses (right leg): 2


7. Limb Ataxia: With eyes open instruct patient to:


-: a. "Touch your finger to your nose".


-: b. "Touch your heel to your shin"


-: 0=Absent


-: 1=Present in one limb.


-: 2=Present in two limbs.


-: UN=Amputation or joint fusion; explain in comments.


7. Responses: 2


7. If ataxia present choose as appropriate: Right arm, Right leg


8. Sensory: Test sensation using pinprick or noxious stimuli.  Test as many body

parts as possible.


-: 0=Normal;no sensory loss


-: 1=Mile to moderate sensory loss (patient feels pin prick but is less sharp on

affected side).


-: 2=Severe or total sensory loss.


8. Responses: 1


9. Best Language: Instruct patient to:


-: a. "Describe what you see in this picture."


-: b. "Name the items in this picture."


-: c. "Read these sentences."


-: 0=No aphasia, normal


-: 1=Mild to moderate aphasia.


-: 2=Severe aphasia


-: 3=Mute, global aphasia, no usable speech or auditory comprehension.


9. Responses: 0


10. Articulation, Dysarthia: Instruct patient to:


-: "Read these words" or "Repeat these words"


-: 0=Normal


-: 1=Mild to moderate; patient may slur some words but can be understood without

difficulty.


-: 2=Severe; patients speech so slurred as to be unintelligible in the absence 

of dysphasia.


-: UN=Intubated or other physical barrier, explain in comments.


10. Responses: 0


11. Extinction or inattention: 0=No abnormality


-: 1= Visual, tactile, auditory, spatial, or personal inattention or extinction 

to bilateral simulation in one or the sensory modalities.


-: 2=Profound chase-inattention or chase-inattention to more than one modality; 

does not recognize own hand.


11. Responses: 0


Total Score: 11





Discharge





- Discharge


Clinical Impression: 


 Acute ischemic stroke





Condition: Stable


Disposition: [facility]

## 2020-10-11 NOTE — ER DOCUMENT REPORT
ED NIH Stroke Scale





- NIH Stroke Scale


When completed:: Before Alteplase


*: 1. NIH scale should be completed with appropriate accompanying assessment 

tools.


*: 2. The NIH should reflect what the patient is capable of doing and should not

be coached by the clinician.


1a. Level of Consciousness: 0=Alert;keenly responsive


-: 1=Drowsy


-: 2=Obtunded


-: 3=Coma/unresponsive or reflex to noxious stimuli.


1b. Orientation Questions: a. What month is it?


-: b. How old are you?


-: 0=Answers both questions correctly.


-: 1=Answers one question correctly or patient is intubated or has orotracheal 

trauma.


-: 2=Answers neither question correctly.


1b. Responses: 0


1c. Response to commands: a. Open and close eyes?


-: b.  and release hand?


-: Credit is given despite weakness. Demonstration of task is permitted. 

Substitute command if hands cannot be used.


-: 0=Performs both tasks correctly


-: 1=Performs one task correctly


-: 2=Performs neither task correctly


2. Gaze: Establish eye contact and instruct patient to "Follow my finger"


-: 0=Normal


-: 1=Partial gaze palsy. Gaze is abnormal in one or both eyes, but where forced 

deviation or total gaze paresis is not present.


-: 2=Forced deviation or total gaze paresis.


3. Visual Fields: Sees fingers in all four quadrants.


-: 0=No visual loss.


-: 1=Partial hemianopsia.


-: 2=Complete hemianopsia.


-: 3=Bilateral hemianopsia (including Cortical blindness)


3. Responses: 1


4. Facial Movement: Instruct patient to:


-: a. Show me your teeth


-: b. Raise your eyebrows


-: c. Close your eyes


-: d. Smile


-: 0=Normal symmetrical movement


-: 1=Minor paralysis (flattened nasolabial fold, asymmetry on smiling).


-: 2=Partial paralysis (total or near total paralysis of lower face).


-: 3=Complete paralysis of upper and lower face


4. Responses: 2


5. Motor functions (left arm): Alternate sides and extend each arm with palms 

down (90 degrees if sitting or 45 degrees for supine).


-: 0=No drift;limb holds for full 10 seconds.


-: 1=Drift; limb holds but drifts down before full 10 seconds, but does not hit 

bed.


-: 2=Some effort against gravity; limb cannot get to or maintain position.


-: 3=No effort against gravity; limb falls.


-: 4=No movement.


-: UN=Amputation, joint fusion, explain in comments.


5. Responses (left arm): 0


5. Motor Functions (right arm): Alternate sides and extend each arm with palms 

down (90 degrees if sitting or 45 degrees for supine).


-: 0=No drift;limb holds for full 10 seconds.


-: 1=Drift; limb holds but drifts down before full 10 seconds, but does not hit 

bed.


-: 2=Some effort against gravity; limb cannot get to or maintain position.


-: 3=No effort against gravity; limb falls.


-: 4=No movement.


-: UN=Amputation, joint fusion, explain in comments.


5. Responses (right arm): 3


6. Motor Functions (left leg): With patient lying supine, alternate sides and 

extend each leg (30 degrees always while supine).


-: 0=No drift, leg holds position for full 5 seconds


-: 1=Drift; leg falls before full 5 seconds but does not hit bed.


-: 2=Some effort against gravity, leg falls to bed but some effort against gra

vity.


-: 3=No effort against gravity, leg falls to bed immediately.


-: 4=No movement.


-: UN=Amputation, joint fusion; explain in comments.


6. Responses (left leg): 0


6. Motor Functions (right leg): With patient lying supine, alternate sides and 

extend each leg (30 degrees always while supine).


-: 0=No drift, leg holds position for full 5 seconds


-: 1=Drift; leg falls before full 5 seconds but does not hit bed.


-: 2=Some effort against gravity, leg falls to bed but some effort against 

gravity.


-: 3=No effort against gravity, leg falls to bed immediately.


-: 4=No movement.


-: UN=Amputation, joint fusion; explain in comments.


6. Responses (right leg): 2


7. Limb Ataxia: With eyes open instruct patient to:


-: a. "Touch your finger to your nose".


-: b. "Touch your heel to your shin"


-: 0=Absent


-: 1=Present in one limb.


-: 2=Present in two limbs.


-: UN=Amputation or joint fusion; explain in comments.


7. Responses: 2


7. If ataxia present choose as appropriate: Right arm, Right leg


8. Sensory: Test sensation using pinprick or noxious stimuli.  Test as many body

parts as possible.


-: 0=Normal;no sensory loss


-: 1=Mile to moderate sensory loss (patient feels pin prick but is less sharp on

affected side).


-: 2=Severe or total sensory loss.


8. Responses: 1


9. Best Language: Instruct patient to:


-: a. "Describe what you see in this picture."


-: b. "Name the items in this picture."


-: c. "Read these sentences."


-: 0=No aphasia, normal


-: 1=Mild to moderate aphasia.


-: 2=Severe aphasia


-: 3=Mute, global aphasia, no usable speech or auditory comprehension.


9. Responses: 0


10. Articulation, Dysarthia: Instruct patient to:


-: "Read these words" or "Repeat these words"


-: 0=Normal


-: 1=Mild to moderate; patient may slur some words but can be understood without

difficulty.


-: 2=Severe; patients speech so slurred as to be unintelligible in the absence 

of dysphasia.


-: UN=Intubated or other physical barrier, explain in comments.


11. Extinction or inattention: 0=No abnormality


-: 1= Visual, tactile, auditory, spatial, or personal inattention or extinction 

to bilateral simulation in one or the sensory modalities.


-: 2=Profound chase-inattention or chase-inattention to more than one modality; 

does not recognize own hand.


11. Responses: 0


Total Score: 11

## 2020-10-11 NOTE — ER DOCUMENT REPORT
ED General





- General


Chief Complaint: S/S of Possible Stroke


Stated Complaint: RIGHT UNDER ARM ISSUE


Time Seen by Provider: 10/11/20 19:51


Mode of Arrival: Ambulatory


TRAVEL OUTSIDE OF THE [place] IN LAST 30 DAYS: No





- Related Data


Allergies/Adverse Reactions: 


                                        





No Known Allergies Allergy (Verified 07/05/17 12:03)


   











Past Medical History





- General


Information source: Patient





- Social History


Smoking Status: Never Smoker


Frequency of alcohol use: None


Drug Abuse: None


Family History: Reviewed & Not Pertinent


Patient has homicidal ideation: No





- Past Medical History


Cardiac Medical History: 


   Denies: Hx Coronary Artery Disease, Hx Heart Attack, Hx Hypertension


Pulmonary Medical History: 


   Denies: Hx Asthma, Hx Bronchitis, Hx COPD, Hx Pneumonia


Neurological Medical History: Denies: Hx Cerebrovascular Accident, Hx Seizures


Renal/ Medical History: Denies: Hx Peritoneal Dialysis


Musculoskeletal Medical History: Denies Hx Arthritis


Past Surgical History: Reports: Hx Appendectomy, Hx Cholecystectomy





- Immunizations


Hx Diphtheria, Pertussis, Tetanus Vaccination: Yes





Physical Exam





- Vital signs


Vitals: 





                                        











Temp Pulse Resp BP Pulse Ox


 


 99.1 F   74   18   133/62 H  95 


 


 10/11/20 19:40  10/11/20 19:40  10/11/20 19:40  10/11/20 19:40  10/11/20 19:40














Course





- Vital Signs


Vital signs: 





                                        











Temp Pulse Resp BP Pulse Ox


 


 99.1 F   74   18   133/62 H  95 


 


 10/11/20 19:40  10/11/20 19:40  10/11/20 19:40  10/11/20 19:40  10/11/20 19:40














- Laboratory


Result Diagrams: 


                                 10/11/20 20:10





                                 10/11/20 20:10


Laboratory results interpreted by me: 





                                        











  10/11/20





  20:10


 


RBC  5.33 H


 


MCV  78 L


 


MCH  26.6 L

## 2020-10-11 NOTE — ER DOCUMENT REPORT
ED Medical Screen (RME)





- General


Chief Complaint: Skin Problem


Stated Complaint: RIGHT UNDER ARM ISSUE


Time Seen by Provider: 10/11/20 19:51


Mode of Arrival: Ambulatory


Information source: Patient


Notes: 





HPI; 25-year-old female presents emergency room with a sudden onset of right arm

heaviness, lip and right side of her face tingly.  Sudden onset of symptoms at 

6:30 PM after getting out of the shower.  Patient states she noticed after 

getting out of the shower that both underarms were discolored and ecchymotic and

then began to have numbness and tingling to her right arm right side of her face

and her lips.  Denies any head trauma or head injury.  No medical history.  No 

medications for symptoms.





PE: Alert and oriented x3.  Decreased  strength to the right.  Decreased 

sensation to painful stimuli to the right side of the face, right arm and right 

leg.  Patient is able to lift her right arm with difficulty drifting noted after

5 seconds.  Lungs: Clear to auscultation without rales, rhonchi, wheezes.  

Heart: Regular rate and rhythm without murmurs, rubs, gallops.  Stroke alert was

called.  Charge nurse was notified.  Patient was taken directly to CT.








I have greeted and performed a rapid initial assessment of this patient.  A 

comprehensive ED assessment and evaluation of the patient, analysis of test 

results and completion of the medical decision making process will be conducted 

by additional ED providers.  I have specifically instructed the patient or 

family members with the patient to immediately return to any nursing staff 

should anything change in the patient's condition or with their chief complaint.


TRAVEL OUTSIDE OF THE [place] IN LAST 30 DAYS: No





- Related Data


Allergies/Adverse Reactions: 


                                        





No Known Allergies Allergy (Verified 07/05/17 12:03)


   











Past Medical History





- Past Medical History


Cardiac Medical History: 


   Denies: Hx Coronary Artery Disease, Hx Heart Attack, Hx Hypertension


Pulmonary Medical History: 


   Denies: Hx Asthma, Hx Bronchitis, Hx COPD, Hx Pneumonia


Neurological Medical History: Denies: Hx Cerebrovascular Accident, Hx Seizures


Renal/ Medical History: Denies: Hx Peritoneal Dialysis


Musculoskeltal Medical History: Denies Hx Arthritis


Past Surgical History: Reports: Hx Appendectomy, Hx Cholecystectomy





- Immunizations


Hx Diphtheria, Pertussis, Tetanus Vaccination: Yes





Physical Exam





- Vital signs


Vitals: 





                                        











Temp Pulse Resp BP Pulse Ox


 


 99.1 F   74   18   133/62 H  95 


 


 10/11/20 19:40  10/11/20 19:40  10/11/20 19:40  10/11/20 19:40  10/11/20 19:40














Course





- Vital Signs


Vital signs: 





                                        











Temp Pulse Resp BP Pulse Ox


 


 99.1 F   74   18   133/62 H  95 


 


 10/11/20 19:40  10/11/20 19:40  10/11/20 19:40  10/11/20 19:40  10/11/20 19:40

## 2020-10-11 NOTE — RADIOLOGY REPORT (SQ)
CLINICAL INDICATION: stroke alert right sided paresthesia.



TECHNIQUE: A single portable AP  view was obtained of the chest

at 2003 hours.



COMPARISON: February 26, 2019.



FINDINGS: The cardiomediastinal  silhouette is normal. The lungs

are grossly clear. No evidence of effusion or pneumothorax. The

visualized bones are unremarkable.



IMPRESSION: No evidence of active intrathoracic disease.

## 2020-10-11 NOTE — RADIOLOGY REPORT (SQ)
INDICATION: stroke alert right sided paresthesia.



COMPARISON:  None



CORRELATION:  None



TECHNIQUE: Noncontrast  spiral axial CT images were obtained from

the skull base to vertex.  This exam was performed according to

our departmental dose-optimization program, which includes

automated exposure control, adjustment of the mA and/or kV

according to patient size and/or use of iterative reconstruction

techniques.



FINDINGS: There is no evidence of acute intracranial hemorrhage,

midline shift, mass effect or mass lesion.  Gray-white

differentiation is normal.  There is no evidence of acute large

territory infarct.



Ventricles and extracerebral spaces are within normal limits, for

age.



The visualized paranasal sinuses are grossly clear. The orbits

and eyeballs are unremarkable.  The mastoid air cells are clear. 

Skull base and calvarium appear intact.





IMPRESSION:

No acute intracranial process is identified.  



The cause of the patient's right-sided paresthesia is not

identified on this examination.